# Patient Record
(demographics unavailable — no encounter records)

---

## 2024-11-27 NOTE — PHYSICAL EXAM
[Chaperone Present] : A chaperone was present in the examining room during all aspects of the physical examination [58509] : A chaperone was present during the pelvic exam. [FreeTextEntry2] : emily mosley [Vulvar Atrophy] : vulvar atrophy [Labia Majora] : normal [Labia Minora] : normal [Normal] : normal [Uterine Adnexae] : normal

## 2024-11-27 NOTE — HISTORY OF PRESENT ILLNESS
[FreeTextEntry1] : 64-year-old white female para 1 here for follow-up visit with history of vaginal and vulvar burning and discomfort.  She is status post recent hospitalization for A-fib.    Patient has had multiple negative vaginal cultures.  She has been advised to consider vaginal estrogen but has not complied with that.  She is not sexually active.  Denies any dysuria or urgency.  Denies vaginal discharge.

## 2024-11-27 NOTE — DISCUSSION/SUMMARY
[FreeTextEntry1] : Patient has chronic vulvitis and I discussed considering some vaginal estrogen supplementation.  Pros and cons were discussed including very small amount of systemic absorption and its associated risks.  Prescription was provided.  Today again yeast, bacterial GC chlamydia trichomonas as well as Ureaplasma cultures were performed.  We will await the culture results but I have encouraged the patient to consider the vaginal estrogen cream.

## 2025-06-09 NOTE — HISTORY OF PRESENT ILLNESS
[FreeTextEntry1] : pt has been in and out oif afib  on flecanide and metroprolol, helping. was tachycardic all yr had cardiac ablation jan 24 now seeing new card, meds controlling  had flu in jan in [place], was tachy  for 10 days.  reluctant to try meds.  has scitaica, back problems, pots syndrome cant exercize much had ct in [place], found right ov cyst 4 cm, in 22 had us showed dexa showed osteoporosis vit d was low taking 2000 medicare denied proctocort, not bleeding since stopping blood thinners/ doesnt want to try fosamax, but will do research.

## 2025-06-09 NOTE — PLAN
[FreeTextEntry1] : Patient with osteoporosis and multiple medical issues, she declines any medication at this time and will start taking some calcium and vitamin D.  We discussed the treatments that are available but she declines.  We spent 30 minutes in this telemedicine conference.